# Patient Record
Sex: FEMALE | Race: WHITE | NOT HISPANIC OR LATINO | Employment: STUDENT | URBAN - METROPOLITAN AREA
[De-identification: names, ages, dates, MRNs, and addresses within clinical notes are randomized per-mention and may not be internally consistent; named-entity substitution may affect disease eponyms.]

---
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
## 2022-02-07 ENCOUNTER — APPOINTMENT (OUTPATIENT)
Dept: GENERAL RADIOLOGY | Facility: CLINIC | Age: 22
End: 2022-02-07
Attending: EMERGENCY MEDICINE
Payer: COMMERCIAL

## 2022-02-07 ENCOUNTER — HOSPITAL ENCOUNTER (EMERGENCY)
Facility: CLINIC | Age: 22
Discharge: HOME | End: 2022-02-07
Attending: EMERGENCY MEDICINE | Admitting: EMERGENCY MEDICINE
Payer: COMMERCIAL

## 2022-02-07 ENCOUNTER — APPOINTMENT (OUTPATIENT)
Dept: ULTRASOUND IMAGING | Facility: CLINIC | Age: 22
End: 2022-02-07
Attending: EMERGENCY MEDICINE
Payer: COMMERCIAL

## 2022-02-07 VITALS
DIASTOLIC BLOOD PRESSURE: 51 MMHG | OXYGEN SATURATION: 99 % | SYSTOLIC BLOOD PRESSURE: 95 MMHG | RESPIRATION RATE: 15 BRPM | HEART RATE: 120 BPM | TEMPERATURE: 99 F

## 2022-02-07 DIAGNOSIS — R10.31 ABDOMINAL PAIN, ACUTE, BILATERAL LOWER QUADRANT: ICD-10-CM

## 2022-02-07 DIAGNOSIS — R10.32 ABDOMINAL PAIN, ACUTE, BILATERAL LOWER QUADRANT: ICD-10-CM

## 2022-02-07 LAB
ALBUMIN SERPL-MCNC: 3.4 G/DL (ref 3.4–5)
ALBUMIN UR-MCNC: 10 MG/DL
ALP SERPL-CCNC: 50 U/L (ref 40–150)
ALT SERPL W P-5'-P-CCNC: 16 U/L (ref 0–50)
AMORPH CRY #/AREA URNS HPF: ABNORMAL /HPF
ANION GAP SERPL CALCULATED.3IONS-SCNC: 3 MMOL/L (ref 3–14)
APPEARANCE UR: ABNORMAL
AST SERPL W P-5'-P-CCNC: 10 U/L (ref 0–35)
B-HCG FREE SERPL-ACNC: <5 IU/L (ref 0–5)
BASOPHILS # BLD AUTO: 0.1 10E3/UL (ref 0–0.2)
BASOPHILS NFR BLD AUTO: 1 %
BILIRUB SERPL-MCNC: 0.3 MG/DL (ref 0.2–1.3)
BILIRUB UR QL STRIP: NEGATIVE
BUN SERPL-MCNC: 11 MG/DL (ref 7–30)
CALCIUM SERPL-MCNC: 8.4 MG/DL (ref 8.5–10.1)
CHLORIDE BLD-SCNC: 111 MMOL/L (ref 96–110)
CO2 SERPL-SCNC: 25 MMOL/L (ref 20–32)
COLOR UR AUTO: YELLOW
CREAT SERPL-MCNC: 0.8 MG/DL (ref 0.5–1)
EOSINOPHIL # BLD AUTO: 0.2 10E3/UL (ref 0–0.7)
EOSINOPHIL NFR BLD AUTO: 2 %
ERYTHROCYTE [DISTWIDTH] IN BLOOD BY AUTOMATED COUNT: 11.6 % (ref 10–15)
GFR SERPL CREATININE-BSD FRML MDRD: >90 ML/MIN/1.73M2
GLUCOSE BLD-MCNC: 95 MG/DL (ref 70–99)
GLUCOSE UR STRIP-MCNC: NEGATIVE MG/DL
HCT VFR BLD AUTO: 39.9 % (ref 35–47)
HGB BLD-MCNC: 13.4 G/DL (ref 11.7–15.7)
HGB UR QL STRIP: NEGATIVE
HOLD SPECIMEN: NORMAL
HOLD SPECIMEN: NORMAL
IMM GRANULOCYTES # BLD: 0 10E3/UL
IMM GRANULOCYTES NFR BLD: 0 %
KETONES UR STRIP-MCNC: NEGATIVE MG/DL
LEUKOCYTE ESTERASE UR QL STRIP: NEGATIVE
LIPASE SERPL-CCNC: 146 U/L (ref 73–393)
LYMPHOCYTES # BLD AUTO: 2.8 10E3/UL (ref 0.8–5.3)
LYMPHOCYTES NFR BLD AUTO: 31 %
MCH RBC QN AUTO: 30.1 PG (ref 26.5–33)
MCHC RBC AUTO-ENTMCNC: 33.6 G/DL (ref 31.5–36.5)
MCV RBC AUTO: 90 FL (ref 78–100)
MONOCYTES # BLD AUTO: 0.9 10E3/UL (ref 0–1.3)
MONOCYTES NFR BLD AUTO: 10 %
MUCOUS THREADS #/AREA URNS LPF: PRESENT /LPF
NEUTROPHILS # BLD AUTO: 5 10E3/UL (ref 1.6–8.3)
NEUTROPHILS NFR BLD AUTO: 56 %
NITRATE UR QL: NEGATIVE
NRBC # BLD AUTO: 0 10E3/UL
NRBC BLD AUTO-RTO: 0 /100
PH UR STRIP: 7.5 [PH] (ref 5–7)
PLATELET # BLD AUTO: 320 10E3/UL (ref 150–450)
POTASSIUM BLD-SCNC: 3.6 MMOL/L (ref 3.4–5.3)
PROT SERPL-MCNC: 6.5 G/DL (ref 6.8–8.8)
RBC # BLD AUTO: 4.45 10E6/UL (ref 3.8–5.2)
RBC URINE: <1 /HPF
SODIUM SERPL-SCNC: 139 MMOL/L (ref 133–144)
SP GR UR STRIP: 1.03 (ref 1–1.03)
SQUAMOUS EPITHELIAL: 2 /HPF
UROBILINOGEN UR STRIP-MCNC: 2 MG/DL
WBC # BLD AUTO: 8.9 10E3/UL (ref 4–11)
WBC URINE: 4 /HPF

## 2022-02-07 PROCEDURE — 96374 THER/PROPH/DIAG INJ IV PUSH: CPT

## 2022-02-07 PROCEDURE — 85025 COMPLETE CBC W/AUTO DIFF WBC: CPT | Performed by: EMERGENCY MEDICINE

## 2022-02-07 PROCEDURE — 84702 CHORIONIC GONADOTROPIN TEST: CPT

## 2022-02-07 PROCEDURE — 76830 TRANSVAGINAL US NON-OB: CPT

## 2022-02-07 PROCEDURE — 81003 URINALYSIS AUTO W/O SCOPE: CPT | Performed by: EMERGENCY MEDICINE

## 2022-02-07 PROCEDURE — 80053 COMPREHEN METABOLIC PANEL: CPT | Performed by: EMERGENCY MEDICINE

## 2022-02-07 PROCEDURE — 83690 ASSAY OF LIPASE: CPT | Performed by: EMERGENCY MEDICINE

## 2022-02-07 PROCEDURE — 250N000013 HC RX MED GY IP 250 OP 250 PS 637: Performed by: EMERGENCY MEDICINE

## 2022-02-07 PROCEDURE — 99285 EMERGENCY DEPT VISIT HI MDM: CPT | Mod: 25

## 2022-02-07 PROCEDURE — 36415 COLL VENOUS BLD VENIPUNCTURE: CPT | Performed by: EMERGENCY MEDICINE

## 2022-02-07 PROCEDURE — 258N000003 HC RX IP 258 OP 636: Performed by: EMERGENCY MEDICINE

## 2022-02-07 PROCEDURE — 250N000011 HC RX IP 250 OP 636: Performed by: EMERGENCY MEDICINE

## 2022-02-07 PROCEDURE — 96375 TX/PRO/DX INJ NEW DRUG ADDON: CPT

## 2022-02-07 PROCEDURE — 74019 RADEX ABDOMEN 2 VIEWS: CPT

## 2022-02-07 PROCEDURE — 96361 HYDRATE IV INFUSION ADD-ON: CPT

## 2022-02-07 RX ORDER — HALOPERIDOL 5 MG/ML
2 INJECTION INTRAMUSCULAR ONCE
Status: COMPLETED | OUTPATIENT
Start: 2022-02-07 | End: 2022-02-07

## 2022-02-07 RX ORDER — METOCLOPRAMIDE HYDROCHLORIDE 5 MG/ML
5 INJECTION INTRAMUSCULAR; INTRAVENOUS ONCE
Status: COMPLETED | OUTPATIENT
Start: 2022-02-07 | End: 2022-02-07

## 2022-02-07 RX ORDER — DICYCLOMINE HCL 20 MG
20 TABLET ORAL ONCE
Status: COMPLETED | OUTPATIENT
Start: 2022-02-07 | End: 2022-02-07

## 2022-02-07 RX ORDER — HALOPERIDOL 5 MG/ML
2 INJECTION INTRAMUSCULAR ONCE
Status: DISCONTINUED | OUTPATIENT
Start: 2022-02-07 | End: 2022-02-07

## 2022-02-07 RX ORDER — DIPHENHYDRAMINE HYDROCHLORIDE 50 MG/ML
25 INJECTION INTRAMUSCULAR; INTRAVENOUS ONCE
Status: COMPLETED | OUTPATIENT
Start: 2022-02-07 | End: 2022-02-07

## 2022-02-07 RX ORDER — SODIUM CHLORIDE 9 MG/ML
INJECTION, SOLUTION INTRAVENOUS CONTINUOUS
Status: DISCONTINUED | OUTPATIENT
Start: 2022-02-07 | End: 2022-02-07 | Stop reason: HOSPADM

## 2022-02-07 RX ORDER — MORPHINE SULFATE 4 MG/ML
4 INJECTION, SOLUTION INTRAMUSCULAR; INTRAVENOUS
Status: DISCONTINUED | OUTPATIENT
Start: 2022-02-07 | End: 2022-02-07 | Stop reason: HOSPADM

## 2022-02-07 RX ADMIN — MORPHINE SULFATE 4 MG: 4 INJECTION INTRAVENOUS at 17:24

## 2022-02-07 RX ADMIN — HALOPERIDOL LACTATE 2 MG: 5 INJECTION, SOLUTION INTRAMUSCULAR at 19:35

## 2022-02-07 RX ADMIN — SODIUM CHLORIDE 1000 ML: 9 INJECTION, SOLUTION INTRAVENOUS at 17:24

## 2022-02-07 RX ADMIN — DICYCLOMINE HYDROCHLORIDE 20 MG: 20 TABLET ORAL at 19:17

## 2022-02-07 RX ADMIN — DIPHENHYDRAMINE HYDROCHLORIDE 25 MG: 50 INJECTION INTRAMUSCULAR; INTRAVENOUS at 19:18

## 2022-02-07 RX ADMIN — METOCLOPRAMIDE HYDROCHLORIDE 5 MG: 5 INJECTION INTRAMUSCULAR; INTRAVENOUS at 19:20

## 2022-02-07 ASSESSMENT — ENCOUNTER SYMPTOMS
VOMITING: 1
SHORTNESS OF BREATH: 1
CHILLS: 0
DYSURIA: 0
NAUSEA: 1
DIARRHEA: 0
FEVER: 1

## 2022-02-07 NOTE — ED TRIAGE NOTES
Pt comes in with shooting generalized lower abd pain and lower back pain + nausea, + vomiting, + dizzy + fever. Pt took 2 tylenol extra strengh last at 2 pm and 5-200 mg ibuprofen at 10 am. Last BM was this am and pt reports it was normal.

## 2022-02-07 NOTE — ED PROVIDER NOTES
History   Chief Complaint:  Abdominal Pain, Nausea & Vomiting, and Fever    The history is provided by the patient.      Juliet Villalobos is a 21 year old female who presents with abdominal pain, nausea, vomiting, and a fever. Per Chart review, she was last seen here two times in December for similar symptoms regarding lower quadrant abdomen pain. For two days straight now, the patient has had gradually worse lower left-sided abdominal pain that radiates to her back. Her last menstrual period was back in December, as she has been on birth control to help with her ovarian cyst. Today, she took 5-200 mg of Ibuprofen pills at 1000 and then at 1400, she had 2 tylenol with extra strength. She has had nausea, vomiting, chest pain, shortness of breath, and a fever. She note have had pain like this before, but without the shortness of breath or fever. Furthermore, denies having any surgeries to her abdomen. The patient denies having any dysuria, diarrhea, or chills. Lastly, she vapes tobacco.     Of note, she does not have a regular Gynecologist she sees.    Review of Systems   Constitutional: Positive for fever. Negative for chills.   Respiratory: Positive for shortness of breath.    Cardiovascular: Positive for chest pain.   Gastrointestinal: Positive for nausea and vomiting. Negative for diarrhea.   Genitourinary: Negative for dysuria.   All other systems reviewed and are negative.    Allergies:  The patient denies having any allergies.    Medications:  The patient is not taking any medications.     Past Medical History:     The patient denies having any past medical history.    Past Surgical History:    The patient denies having any past surgical history.    Family History:    The patient denies having any family history.     Social History:  The patient presents alone.  The patient vapes tobacco.     Physical Exam     Patient Vitals for the past 24 hrs:   BP Temp Temp src Pulse Resp SpO2   02/07/22 2045 -- -- -- 120 --  99 %   02/07/22 2030 -- -- -- 69 -- 98 %   02/07/22 2015 95/51 -- -- 68 -- 98 %   02/07/22 1945 105/60 -- -- 79 -- 100 %   02/07/22 1900 108/66 -- -- 97 -- 100 %   02/07/22 1800 107/63 -- -- 87 -- 100 %   02/07/22 1730 110/76 -- -- 95 -- --   02/07/22 1725 122/86 -- -- 114 -- --   02/07/22 1657 125/81 99  F (37.2  C) Oral 81 15 100 %     Physical Exam  Constitutional: Well developed, nontox appearance  Head: Atraumatic.   Neck:  no stridor  Eyes: no scleral icterus  Cardiovascular: RRR, 2+ bilat radial, DP pulses  Pulmonary/Chest: nml resp effort, Clear BS bilat  Abdominal: ND, soft, LLQ > RLQ tenderness, no rebound or guarding   : no CVA tenderness bilat  Ext: Warm, well perfused, no edema  Neurological: A&O, symmetric facies, moves ext x4  Skin: Skin is warm and dry.   Psychiatric: Behavior is normal. Thought content normal.   Nursing note and vitals reviewed.    Emergency Department Course     Imaging:  XR Abdomen 2 Views   Final Result   IMPRESSION: Moderate stool of the proximal colon. No small bowel obstruction or free air.       US Pelvis Cmplt w Transvag & Doppler LmtPel Duplex Limited   Final Result   IMPRESSION:     1.  Small free pelvic fluid.   2.  No other acute abnormality is seen.           Report per radiology    Laboratory:  Labs Ordered and Resulted from Time of ED Arrival to Time of ED Departure   COMPREHENSIVE METABOLIC PANEL - Abnormal       Result Value    Sodium 139      Potassium 3.6      Chloride 111 (*)     Carbon Dioxide (CO2) 25      Anion Gap 3      Urea Nitrogen 11      Creatinine 0.80      Calcium 8.4 (*)     Glucose 95      Alkaline Phosphatase 50      AST 10      ALT 16      Protein Total 6.5 (*)     Albumin 3.4      Bilirubin Total 0.3      GFR Estimate >90     ROUTINE UA WITH MICROSCOPIC REFLEX TO CULTURE - Abnormal    Color Urine Yellow      Appearance Urine Slightly Cloudy (*)     Glucose Urine Negative      Bilirubin Urine Negative      Ketones Urine Negative      Specific  Gravity Urine 1.030      Blood Urine Negative      pH Urine 7.5 (*)     Protein Albumin Urine 10  (*)     Urobilinogen Urine 2.0      Nitrite Urine Negative      Leukocyte Esterase Urine Negative      Mucus Urine Present (*)     Amorphous Crystals Urine Few (*)     RBC Urine <1      WBC Urine 4      Squamous Epithelials Urine 2 (*)    LIPASE - Normal    Lipase 146     ISTAT HCG QUANTITATIVE PREGNANCY POCT - Normal    HCG QUANTITATIVE POCT <5.0     CBC WITH PLATELETS AND DIFFERENTIAL    WBC Count 8.9      RBC Count 4.45      Hemoglobin 13.4      Hematocrit 39.9      MCV 90      MCH 30.1      MCHC 33.6      RDW 11.6      Platelet Count 320      % Neutrophils 56      % Lymphocytes 31      % Monocytes 10      % Eosinophils 2      % Basophils 1      % Immature Granulocytes 0      NRBCs per 100 WBC 0      Absolute Neutrophils 5.0      Absolute Lymphocytes 2.8      Absolute Monocytes 0.9      Absolute Eosinophils 0.2      Absolute Basophils 0.1      Absolute Immature Granulocytes 0.0      Absolute NRBCs 0.0       Emergency Department Course:         Reviewed:  I reviewed nursing notes, vitals, past medical history, Care Everywhere and [facility]    Assessments:   I obtained history and examined the patient as noted above.    I rechecked the patient and explained findings.    Interventions:  1724 0.9% sodium chloride BOLUS, IV  172 morphine (PF) injection 4 mg, IV   dicyclomine (BENTYL) tablet 20 mg, PO  191 diphenhydrAMINE (BENADRYL) injection 25 mg, IV  1920 metoclopramide (REGLAN) injection 5 mg, IV   haloperidol lactate (HALDOL) injection 2 mg, IV    Disposition:  The patient was discharged to home.     Impression & Plan     CMS Diagnoses: None    Medical Decision Makin year old female presenting w/ bilateral lower quadrant abdominal pain     DDx includes ovarian cyst, ovarian torsion, UTI, diverticulitis although less likely given similar episodes previously, age and risk factors; abdominal pain NOS,  pregnancy, ectopic pregnancy, abdominal migraine.  Doubt vascular catastrophe, diverticulitis, intra-abdominal abscess given history, physical exam, age and risk factors.  Less likely PID as well given similar symptoms previously that resolved without antibiotic treatment, no history of fever or vaginal discharge therefore pelvic exam deferred.  Labs significant for no remarkable abnormality.  Imaging sig for unremarkable TVUS without evidence of torsion.  Interventions as noted above with improvement in symptoms.  At this time I feel the pt is safe for discharge.  Recommendations given regarding follow up with PCP, OBGYN or GI and return to the emergency department as needed for new or worsening symptoms.  Patient counseled on all results, disposition and diagnosis.  They are understanding and agreeable to plan. Patient discharged in stable condition.    Diagnosis:    ICD-10-CM    1. Abdominal pain, acute, bilateral lower quadrant  R10.31     R10.32      Scribe Disclosure:  RAFAEL Chelo Lexa, am serving as a scribe at 5:20 PM on 2/7/2022 to document services personally performed by Alberto Howard MD based on my observations and the provider's statements to me.     Alberto Howard MD  02/07/22 9069

## 2022-02-08 NOTE — DISCHARGE INSTRUCTIONS
Try over-the-counter magnesium citrate or MiraLAX to help with a bowel cleanout.    Please return to the emergency department as needed for new or worsening symptoms including severe uncontrolled pain, vomiting and unable to keep any down, fainting, any other concerning symptoms.

## 2022-05-06 ENCOUNTER — HOSPITAL ENCOUNTER (EMERGENCY)
Facility: CLINIC | Age: 22
Discharge: HOME | End: 2022-05-06
Attending: EMERGENCY MEDICINE | Admitting: EMERGENCY MEDICINE
Payer: COMMERCIAL

## 2022-05-06 ENCOUNTER — APPOINTMENT (OUTPATIENT)
Dept: GENERAL RADIOLOGY | Facility: CLINIC | Age: 22
End: 2022-05-06
Attending: EMERGENCY MEDICINE
Payer: COMMERCIAL

## 2022-05-06 VITALS — RESPIRATION RATE: 25 BRPM | SYSTOLIC BLOOD PRESSURE: 108 MMHG | HEART RATE: 95 BPM | DIASTOLIC BLOOD PRESSURE: 69 MMHG

## 2022-05-06 DIAGNOSIS — R07.9 NONSPECIFIC CHEST PAIN: ICD-10-CM

## 2022-05-06 LAB
ALBUMIN SERPL-MCNC: 3.4 G/DL (ref 3.4–5)
ALP SERPL-CCNC: 52 U/L (ref 40–150)
ALT SERPL W P-5'-P-CCNC: 18 U/L (ref 0–50)
ANION GAP SERPL CALCULATED.3IONS-SCNC: 7 MMOL/L (ref 3–14)
AST SERPL W P-5'-P-CCNC: 9 U/L (ref 0–35)
ATRIAL RATE - MUSE: 77 BPM
BASOPHILS # BLD AUTO: 0.1 10E3/UL (ref 0–0.2)
BASOPHILS NFR BLD AUTO: 1 %
BILIRUB SERPL-MCNC: 0.5 MG/DL (ref 0.2–1.3)
BUN SERPL-MCNC: 10 MG/DL (ref 7–30)
CALCIUM SERPL-MCNC: 8.6 MG/DL (ref 8.5–10.1)
CHLORIDE BLD-SCNC: 109 MMOL/L (ref 96–110)
CO2 SERPL-SCNC: 25 MMOL/L (ref 20–32)
CREAT SERPL-MCNC: 0.61 MG/DL (ref 0.5–1)
D DIMER PPP FEU-MCNC: <0.27 UG/ML FEU (ref 0–0.5)
DIASTOLIC BLOOD PRESSURE - MUSE: NORMAL MMHG
EOSINOPHIL # BLD AUTO: 0.2 10E3/UL (ref 0–0.7)
EOSINOPHIL NFR BLD AUTO: 2 %
ERYTHROCYTE [DISTWIDTH] IN BLOOD BY AUTOMATED COUNT: 11.7 % (ref 10–15)
GFR SERPL CREATININE-BSD FRML MDRD: >90 ML/MIN/1.73M2
GLUCOSE BLD-MCNC: 103 MG/DL (ref 70–99)
HCG SER QL IA.RAPID: NEGATIVE
HCT VFR BLD AUTO: 37.4 % (ref 35–47)
HGB BLD-MCNC: 12.5 G/DL (ref 11.7–15.7)
IMM GRANULOCYTES # BLD: 0 10E3/UL
IMM GRANULOCYTES NFR BLD: 0 %
INTERPRETATION ECG - MUSE: NORMAL
LIPASE SERPL-CCNC: 114 U/L (ref 73–393)
LYMPHOCYTES # BLD AUTO: 2.3 10E3/UL (ref 0.8–5.3)
LYMPHOCYTES NFR BLD AUTO: 26 %
MCH RBC QN AUTO: 30.3 PG (ref 26.5–33)
MCHC RBC AUTO-ENTMCNC: 33.4 G/DL (ref 31.5–36.5)
MCV RBC AUTO: 91 FL (ref 78–100)
MONOCYTES # BLD AUTO: 0.8 10E3/UL (ref 0–1.3)
MONOCYTES NFR BLD AUTO: 9 %
NEUTROPHILS # BLD AUTO: 5.5 10E3/UL (ref 1.6–8.3)
NEUTROPHILS NFR BLD AUTO: 62 %
NRBC # BLD AUTO: 0 10E3/UL
NRBC BLD AUTO-RTO: 0 /100
P AXIS - MUSE: 52 DEGREES
PLATELET # BLD AUTO: 276 10E3/UL (ref 150–450)
POTASSIUM BLD-SCNC: 3.5 MMOL/L (ref 3.4–5.3)
PR INTERVAL - MUSE: 136 MS
PROT SERPL-MCNC: 6.5 G/DL (ref 6.8–8.8)
QRS DURATION - MUSE: 98 MS
QT - MUSE: 370 MS
QTC - MUSE: 418 MS
R AXIS - MUSE: 59 DEGREES
RBC # BLD AUTO: 4.13 10E6/UL (ref 3.8–5.2)
SODIUM SERPL-SCNC: 141 MMOL/L (ref 133–144)
SYSTOLIC BLOOD PRESSURE - MUSE: NORMAL MMHG
T AXIS - MUSE: 35 DEGREES
TROPONIN I SERPL HS-MCNC: <3 NG/L
VENTRICULAR RATE- MUSE: 77 BPM
WBC # BLD AUTO: 8.7 10E3/UL (ref 4–11)

## 2022-05-06 PROCEDURE — 250N000013 HC RX MED GY IP 250 OP 250 PS 637: Performed by: EMERGENCY MEDICINE

## 2022-05-06 PROCEDURE — 84702 CHORIONIC GONADOTROPIN TEST: CPT

## 2022-05-06 PROCEDURE — 80053 COMPREHEN METABOLIC PANEL: CPT | Performed by: EMERGENCY MEDICINE

## 2022-05-06 PROCEDURE — 99285 EMERGENCY DEPT VISIT HI MDM: CPT | Mod: 25

## 2022-05-06 PROCEDURE — 250N000011 HC RX IP 250 OP 636: Performed by: EMERGENCY MEDICINE

## 2022-05-06 PROCEDURE — 36415 COLL VENOUS BLD VENIPUNCTURE: CPT | Performed by: EMERGENCY MEDICINE

## 2022-05-06 PROCEDURE — 93005 ELECTROCARDIOGRAM TRACING: CPT

## 2022-05-06 PROCEDURE — 85025 COMPLETE CBC W/AUTO DIFF WBC: CPT | Performed by: EMERGENCY MEDICINE

## 2022-05-06 PROCEDURE — 85379 FIBRIN DEGRADATION QUANT: CPT | Performed by: EMERGENCY MEDICINE

## 2022-05-06 PROCEDURE — 84484 ASSAY OF TROPONIN QUANT: CPT | Performed by: EMERGENCY MEDICINE

## 2022-05-06 PROCEDURE — 83690 ASSAY OF LIPASE: CPT | Performed by: EMERGENCY MEDICINE

## 2022-05-06 PROCEDURE — 71046 X-RAY EXAM CHEST 2 VIEWS: CPT

## 2022-05-06 PROCEDURE — 96374 THER/PROPH/DIAG INJ IV PUSH: CPT

## 2022-05-06 RX ORDER — ACETAMINOPHEN 500 MG
1000 TABLET ORAL ONCE
Status: COMPLETED | OUTPATIENT
Start: 2022-05-06 | End: 2022-05-06

## 2022-05-06 RX ORDER — MAGNESIUM HYDROXIDE/ALUMINUM HYDROXICE/SIMETHICONE 120; 1200; 1200 MG/30ML; MG/30ML; MG/30ML
30 SUSPENSION ORAL ONCE
Status: COMPLETED | OUTPATIENT
Start: 2022-05-06 | End: 2022-05-06

## 2022-05-06 RX ORDER — KETOROLAC TROMETHAMINE 15 MG/ML
15 INJECTION, SOLUTION INTRAMUSCULAR; INTRAVENOUS ONCE
Status: DISCONTINUED | OUTPATIENT
Start: 2022-05-06 | End: 2022-05-06

## 2022-05-06 RX ORDER — KETOROLAC TROMETHAMINE 15 MG/ML
15 INJECTION, SOLUTION INTRAMUSCULAR; INTRAVENOUS ONCE
Status: COMPLETED | OUTPATIENT
Start: 2022-05-06 | End: 2022-05-06

## 2022-05-06 RX ADMIN — ALUMINUM HYDROXIDE, MAGNESIUM HYDROXIDE, AND SIMETHICONE 30 ML: 200; 200; 20 SUSPENSION ORAL at 09:17

## 2022-05-06 RX ADMIN — KETOROLAC TROMETHAMINE 15 MG: 15 INJECTION, SOLUTION INTRAMUSCULAR; INTRAVENOUS at 09:28

## 2022-05-06 RX ADMIN — ACETAMINOPHEN 1000 MG: 500 TABLET, FILM COATED ORAL at 10:31

## 2022-05-06 ASSESSMENT — ENCOUNTER SYMPTOMS
ABDOMINAL PAIN: 1
CHILLS: 0
NAUSEA: 1
DIZZINESS: 1
FEVER: 0
COUGH: 0
SHORTNESS OF BREATH: 0

## 2022-05-06 NOTE — ED PROVIDER NOTES
History   Chief Complaint:  Chest Pain     The history is provided by the patient.      Juliet Villalobos is an otherwise healthy 21 year old female who presents with chest pain. The patient reports that she was driving to work at about 0700 when she had sudden onset of chest pain under her right breast. The pain occasionally shoots down to the center of her chest. It worsens with standing and moving. Putting pressure on her chest alleviates the pain. She has associated dizziness and nausea. No fevers, chills, shortness of breath or cough. She notes abdominal pain but states this has been ongoing and is not new today. No personal or family history of clots, and she denies recent travel, surgery, or leg swelling. She does take birth control. She denies history of asthma. She uses a nicotine vape.     Review of Systems   Constitutional: Negative for chills and fever.   Respiratory: Negative for cough and shortness of breath.    Cardiovascular: Positive for chest pain. Negative for leg swelling.   Gastrointestinal: Positive for abdominal pain and nausea.   Neurological: Positive for dizziness.   All other systems reviewed and are negative.    Allergies:  No Known Allergies    Medications:  Prilosec  Levonorgestrel-ethinyl-estradiol     Past Medical History:     Migraines     Social History:  Presents to ED alone.   Endorses use of a nicotine vape.     Physical Exam     Patient Vitals for the past 24 hrs:   BP Pulse Resp   05/06/22 1040 108/69 -- --   05/06/22 1010 -- 95 25   05/06/22 1000 -- 73 24   05/06/22 0945 -- 73 22   05/06/22 0930 -- 72 23   05/06/22 0920 101/55 80 17       Physical Exam  General: Adult female sitting upright  Eyes: PERRL, Conjunctive within normal limits.  No scleral icterus.  ENT: Moist mucous membranes, oropharynx clear.   CV: Normal S1S2, no murmur, rub or gallop. Regular rate and rhythm  Resp: Clear to auscultation bilaterally, no wheezes, rales or rhonchi. Normal respiratory effort.  GI:  Abdomen is soft and nondistended.  Mild epigastric tenderness to palpation.  Generalized lower abdominal tenderness palpation.  No palpable masses. No rebound or guarding.  MSK: No edema. Nontender. Normal active range of motion.  No chest wall tenderness to palpation.  Skin: Warm and dry. No rashes or lesions or ecchymoses on visible skin.  Neuro: Alert and oriented. Responds appropriately to all questions and commands. No focal findings appreciated. Normal muscle tone.  Psych: Mildly anxious appearing.  Otherwise pleasant and cooperative.    Emergency Department Course   ECG  ECG obtained at 0802, ECG read at 0827  Normal sinus rhythm  Normal ECG   Rate 77 bpm. TN interval 136 ms. QRS duration 98 ms. QT/QTc 370/418 ms. P-R-T axes 52 59 35.     Imaging:  Chest XR,  PA & LAT   Preliminary Result   IMPRESSION: Normal two views of the chest.         Report per radiology    Laboratory:  Labs Ordered and Resulted from Time of ED Arrival to Time of ED Departure   COMPREHENSIVE METABOLIC PANEL - Abnormal       Result Value    Sodium 141      Potassium 3.5      Chloride 109      Carbon Dioxide (CO2) 25      Anion Gap 7      Urea Nitrogen 10      Creatinine 0.61      Calcium 8.6      Glucose 103 (*)     Alkaline Phosphatase 52      AST 9      ALT 18      Protein Total 6.5 (*)     Albumin 3.4      Bilirubin Total 0.5      GFR Estimate >90     LIPASE - Normal    Lipase 114     TROPONIN I - Normal    Troponin I High Sensitivity <3     D DIMER QUANTITATIVE - Normal    D-Dimer Quantitative <0.27     ISTAT HCG QUALITATIVE PREGNANCY POCT - Normal    HCG Qualitative POCT Negative     CBC WITH PLATELETS AND DIFFERENTIAL    WBC Count 8.7      RBC Count 4.13      Hemoglobin 12.5      Hematocrit 37.4      MCV 91      MCH 30.3      MCHC 33.4      RDW 11.7      Platelet Count 276      % Neutrophils 62      % Lymphocytes 26      % Monocytes 9      % Eosinophils 2      % Basophils 1      % Immature Granulocytes 0      NRBCs per 100 WBC 0       Absolute Neutrophils 5.5      Absolute Lymphocytes 2.3      Absolute Monocytes 0.8      Absolute Eosinophils 0.2      Absolute Basophils 0.1      Absolute Immature Granulocytes 0.0      Absolute NRBCs 0.0        Emergency Department Course:     Reviewed:  I reviewed nursing notes, vitals, past medical history and Care Everywhere.    Assessments:  0828 I obtained history and examined the patient as noted above.   1036 I rechecked the patient and explained findings.     Interventions:  0917 GI Cocktail (Maalox/Mylanta and viscous Lidocaine) 30 mL suspension, PO    0928 Toradol 15 mg, IV  1031 Tylenol 1000 mg, PO    Disposition:  The patient was discharged to home.     Impression & Plan     Medical Decision Making:  Juliet Villalobos is a 21 year old female healthy at baseline presented to the Emergency Department with a complaint of chest pain. Fortunately the workup in the ED has been unremarkable and at this time I am not concerned for ACS. The EKG shows normal sinus rhythm. The troponin is negative. Chest x-ray is unremarkable. Given these findings, she is low risk for a major cardiac event at 6 weeks.     I considered other possible causes of chest pain including PE, infection, pneumothorax, aortic dissection, and even more benign causes such as reflux and esophageal motility issues. The physical exam, laboratory, and radiological findings listed above make life threatening conditions less likely. At this time I believe the patient is stable for discharge. I have encouraged close Primary Care Physician follow up.  She understands the importance of follow-up within the next 3 to 5 days primary care provider and return should symptoms worsen.  All questions were answered prior to discharge.    Diagnosis:    ICD-10-CM    1. Nonspecific chest pain  R07.9      Scribe Disclosure:  Susan HALL, am serving as a scribe at 8:26 AM on 5/6/2022 to document services personally performed by Mary Andre MD based  on my observations and the provider's statements to me.      Mary Andre MD  05/06/22 1677

## 2022-05-06 NOTE — ED TRIAGE NOTES
Right sided chest pain behind her breast that radiates to mid epigastric pain. Was driving when the pain came on suddenly.

## 2022-05-06 NOTE — ED TRIAGE NOTES
Triage Assessment     Row Name 05/06/22 0757       Triage Assessment (Adult)    Airway WDL WDL       Respiratory WDL    Respiratory WDL WDL       Skin Circulation/Temperature WDL    Skin Circulation/Temperature WDL WDL       Cardiac WDL    Cardiac WDL WDL       Peripheral/Neurovascular WDL    Peripheral Neurovascular WDL WDL       Cognitive/Neuro/Behavioral WDL    Cognitive/Neuro/Behavioral WDL WDL

## 2024-03-02 ENCOUNTER — APPOINTMENT (OUTPATIENT)
Dept: GENERAL RADIOLOGY | Facility: CLINIC | Age: 24
End: 2024-03-02
Attending: EMERGENCY MEDICINE
Payer: OTHER MISCELLANEOUS

## 2024-03-02 ENCOUNTER — HOSPITAL ENCOUNTER (EMERGENCY)
Facility: CLINIC | Age: 24
Discharge: HOME | End: 2024-03-02
Attending: EMERGENCY MEDICINE | Admitting: EMERGENCY MEDICINE
Payer: OTHER MISCELLANEOUS

## 2024-03-02 VITALS
BODY MASS INDEX: 25.11 KG/M2 | RESPIRATION RATE: 18 BRPM | TEMPERATURE: 98 F | SYSTOLIC BLOOD PRESSURE: 147 MMHG | DIASTOLIC BLOOD PRESSURE: 87 MMHG | WEIGHT: 160 LBS | HEIGHT: 67 IN | OXYGEN SATURATION: 98 % | HEART RATE: 107 BPM

## 2024-03-02 DIAGNOSIS — S92.425A NONDISPLACED FRACTURE OF DISTAL PHALANX OF LEFT GREAT TOE, INITIAL ENCOUNTER FOR CLOSED FRACTURE: ICD-10-CM

## 2024-03-02 DIAGNOSIS — S91.112A LACERATION OF LEFT GREAT TOE WITHOUT FOREIGN BODY PRESENT OR DAMAGE TO NAIL, INITIAL ENCOUNTER: ICD-10-CM

## 2024-03-02 DIAGNOSIS — S97.102A CRUSHING INJURY OF TOE OF LEFT FOOT, INITIAL ENCOUNTER: ICD-10-CM

## 2024-03-02 PROCEDURE — 250N000011 HC RX IP 250 OP 636: Performed by: EMERGENCY MEDICINE

## 2024-03-02 PROCEDURE — 99284 EMERGENCY DEPT VISIT MOD MDM: CPT | Mod: 25

## 2024-03-02 PROCEDURE — 250N000009 HC RX 250

## 2024-03-02 PROCEDURE — 73660 X-RAY EXAM OF TOE(S): CPT | Mod: LT

## 2024-03-02 PROCEDURE — 28490 TREAT BIG TOE FRACTURE: CPT | Mod: TA

## 2024-03-02 PROCEDURE — 12002 RPR S/N/AX/GEN/TRNK2.6-7.5CM: CPT

## 2024-03-02 PROCEDURE — 250N000013 HC RX MED GY IP 250 OP 250 PS 637: Performed by: EMERGENCY MEDICINE

## 2024-03-02 RX ORDER — OXYCODONE HYDROCHLORIDE 5 MG/1
5-10 TABLET ORAL EVERY 6 HOURS PRN
Qty: 12 TABLET | Refills: 0 | Status: SHIPPED | OUTPATIENT
Start: 2024-03-02

## 2024-03-02 RX ORDER — ACETAMINOPHEN 325 MG/1
650 TABLET ORAL ONCE
Status: COMPLETED | OUTPATIENT
Start: 2024-03-02 | End: 2024-03-02

## 2024-03-02 RX ORDER — LIDOCAINE HYDROCHLORIDE AND EPINEPHRINE 10; 10 MG/ML; UG/ML
INJECTION, SOLUTION INFILTRATION; PERINEURAL
Status: DISCONTINUED
Start: 2024-03-02 | End: 2024-03-02 | Stop reason: HOSPADM

## 2024-03-02 RX ORDER — FENTANYL CITRATE 50 UG/ML
100 INJECTION, SOLUTION INTRAMUSCULAR; INTRAVENOUS ONCE
Status: COMPLETED | OUTPATIENT
Start: 2024-03-02 | End: 2024-03-02

## 2024-03-02 RX ORDER — IBUPROFEN 600 MG/1
600 TABLET, FILM COATED ORAL ONCE
Status: COMPLETED | OUTPATIENT
Start: 2024-03-02 | End: 2024-03-02

## 2024-03-02 RX ORDER — OXYCODONE HYDROCHLORIDE 5 MG/1
5 TABLET ORAL ONCE
Status: COMPLETED | OUTPATIENT
Start: 2024-03-02 | End: 2024-03-02

## 2024-03-02 RX ADMIN — IBUPROFEN 600 MG: 600 TABLET ORAL at 16:59

## 2024-03-02 RX ADMIN — Medication: at 16:59

## 2024-03-02 RX ADMIN — FENTANYL CITRATE 100 MCG: 0.05 INJECTION, SOLUTION INTRAMUSCULAR; INTRAVENOUS at 18:53

## 2024-03-02 RX ADMIN — OXYCODONE HYDROCHLORIDE 5 MG: 5 TABLET ORAL at 18:53

## 2024-03-02 RX ADMIN — ACETAMINOPHEN 650 MG: 325 TABLET, FILM COATED ORAL at 16:58

## 2024-03-02 ASSESSMENT — ACTIVITIES OF DAILY LIVING (ADL)
ADLS_ACUITY_SCORE: 35

## 2024-03-02 ASSESSMENT — COLUMBIA-SUICIDE SEVERITY RATING SCALE - C-SSRS
2. HAVE YOU ACTUALLY HAD ANY THOUGHTS OF KILLING YOURSELF IN THE PAST MONTH?: NO
1. IN THE PAST MONTH, HAVE YOU WISHED YOU WERE DEAD OR WISHED YOU COULD GO TO SLEEP AND NOT WAKE UP?: NO
6. HAVE YOU EVER DONE ANYTHING, STARTED TO DO ANYTHING, OR PREPARED TO DO ANYTHING TO END YOUR LIFE?: NO

## 2024-03-02 NOTE — ED TRIAGE NOTES
PT reports that she dropped a door onto left big toe and noted blood to shoes later. Numbness to big toe and dressing applied during triage. VSS

## 2024-03-02 NOTE — LETTER
March 2, 2024      To Whom It May Concern:      Juliet Villalobos was seen in our Emergency Department today, 03/02/24.  I expect her condition to improve over the next 3 days.  She may return to work/school when improved.    Sincerely,        Anastasia Chaney RN

## 2024-03-03 NOTE — ED PROVIDER NOTES
"[facility] Provider Note  [facility] Emergency Department  11:54 PM  3/2/2024    Juliet Villalobos  23 year oldfemale    Chief Complaint   Patient presents with    Laceration       HPI:    Otherwise healthy 23-year-old female presenting here to the emergency room with accident at her workplace in which she was carrying an anterior door and actually dropped it onto her foot injuring her left big toe.  She looked down and noticed that there was blood soaking through her sock/shoe.    No other injuries.      Last tetanus 2013      Independent Historian:     None     Review of External Notes:     None           ROS: 10 point ROS completed and negative other than mentioned above        No past medical history on file.  No past surgical history on file.        Current Outpatient Medications   Medication Instructions    oxyCODONE (ROXICODONE) 5-10 mg, Oral, EVERY 6 HOURS PRN          No Known Allergies      Physical Exam  Vitals: BP (!) 147/87   Pulse 107   Temp 98  F (36.7  C) (Temporal)   Resp 18   Ht 1.702 m (5' 7\")   Wt 72.6 kg (160 lb)   LMP 02/24/2024   SpO2 98%   BMI 25.06 kg/m      HEENT:  mmm, no rhinorrhea  Neck: supple, no abnormal swelling  Lungs:  CTAB,  no resp distress  CV: rrr, no m/r/g, ppi  Abd: soft, nontender, nondistended, no rebound/masses/guarding/hsm  Ext: no peripheral edema  Skin: warm, dry, well perfused, no rashes/bruising/lesions on exposed skin  Neuro: alert, MAEE, no gross motor or sensory deficits, gait stable  Psych: Normal mood, normal affect      Labs and Imaging:    Labs Ordered and Resulted from Time of ED Arrival to Time of ED Departure - No data to display       XR Toe Left G/E 2 Views   Final Result   IMPRESSION: Comminuted mildly displaced fracture of the first distal phalanx without intra-articular extension. Normal joint spaces and alignment. No additional fracture.      NOTE: ABNORMAL REPORT      THE DICTATION ABOVE DESCRIBES AN ABNORMALITY FOR WHICH FOLLOW-UP IS NEEDED.     "                Independent Interpretation (X-rays, CTs, rhythm strip):    Comminuted minimally displaced fracture of the distal phalanx of the left great toe    Consultations/Discussion of Management or Tests:    None     Social Determinants of Health affecting care:    None         ED Medications:   Medications   lido-EPINEPHrine-tetracaine (LET) 4-0.18-0.5 % topical gel GEL (  $Given 3/2/24 1659)   acetaminophen (TYLENOL) tablet 650 mg (650 mg Oral $Given 3/2/24 1658)   ibuprofen (ADVIL/MOTRIN) tablet 600 mg (600 mg Oral $Given 3/2/24 1659)   fentaNYL (PF) (SUBLIMAZE) injection 100 mcg (100 mcg Nasal $Given 3/2/24 1853)   oxyCODONE (ROXICODONE) tablet 5 mg (5 mg Oral $Given 3/2/24 1853)           ED Course:           Laceration Repair      Procedure: Laceration Repair    Indication: Laceration    Consent: Verbal    Location: Left great toe lateral aspect proximal phalanx    Length: 3 cm    Preparation: Irrigation with Sterile Saline.    Anesthesia/Sedation: Topical -LET and Lidocaine with Epinephrine - 1%      Treatment/Exploration: Wound explored, no foreign bodies found no visible tendon injury    Closure: The wound was closed with one layer. Skin/superficial layer was closed with 4 x 3-0 Nylon using Interrupted sutures.     Patient Status: The patient tolerated the procedure well: Yes. There were no complications.      Medical Decision Making:    Crush injury left great toe resulting in a laceration repaired as above and a minimally displaced comminuted distal phalanx fracture.  Neurovascular structures are intact, flexor extensor tendon intact.  Discharge home, analgesics, postop shoe, follow-up with podiatry.      Diagnosis:    ICD-10-CM    1. Crushing injury of toe of left foot, initial encounter  S97.102A       2. Laceration of left great toe without foreign body present or damage to nail, initial encounter  S91.112A       3. Nondisplaced fracture of distal phalanx of left great toe, initial encounter for  closed fracture  S92.425A             Disposition:  home      Jay Day MD  [facility]       Jay Day MD  03/03/24 0000

## 2024-04-13 ENCOUNTER — HEALTH MAINTENANCE LETTER (OUTPATIENT)
Age: 24
End: 2024-04-13

## 2024-11-15 NOTE — DISCHARGE INSTRUCTIONS
You can treat your pain with acetaminophen (a.k.a. Tylenol) and ibuprofen (a.k.a. Advil, Motrin).    The dose of acetaminophen is 650mg (2 tablets of regular strength) which can be taken every 6 hours.  The dose of ibuprofen is 600mg (3 tablets of regular strength) which can be taken every 6 hours.    The best way to stay ahead of your pain is to alternate these medications every 3 hours.     For example:  You can take the first dose of acetaminophen when you get home.  After 3 hours, you can take ibuprofen.  After another 3 hours, you can take acetaminophen again.  After another 3 hours, you can take ibuprofen again.  You do not need to wake from sleep to take these medications.  Just take the next dose once you wake up.    Both of these medications are readily available over-the-counter at your local drug store.      Follow-up recommended: with your primary care doctor in the next week    While we are reassured by what we are seeing in the emergency department today, please understand that we only see you at one moment in time.  Because of this, if your symptoms worsen, change, or you develop new, concerning symptoms, you should return to the emergency department for further evaluation. We are always happy to take care of you here at [facility].    
Stable

## 2025-04-19 ENCOUNTER — HEALTH MAINTENANCE LETTER (OUTPATIENT)
Age: 25
End: 2025-04-19